# Patient Record
Sex: FEMALE | Race: WHITE | Employment: PART TIME | URBAN - METROPOLITAN AREA
[De-identification: names, ages, dates, MRNs, and addresses within clinical notes are randomized per-mention and may not be internally consistent; named-entity substitution may affect disease eponyms.]

---

## 2017-02-13 ENCOUNTER — OFFICE VISIT (OUTPATIENT)
Dept: FAMILY MEDICINE CLINIC | Age: 37
End: 2017-02-13

## 2017-02-13 VITALS
BODY MASS INDEX: 21.99 KG/M2 | TEMPERATURE: 98.5 F | OXYGEN SATURATION: 98 % | DIASTOLIC BLOOD PRESSURE: 64 MMHG | HEART RATE: 85 BPM | WEIGHT: 132 LBS | RESPIRATION RATE: 18 BRPM | SYSTOLIC BLOOD PRESSURE: 107 MMHG | HEIGHT: 65 IN

## 2017-02-13 DIAGNOSIS — J02.0 STREP THROAT: ICD-10-CM

## 2017-02-13 DIAGNOSIS — J02.9 SORE THROAT: Primary | ICD-10-CM

## 2017-02-13 LAB
S PYO AG THROAT QL: NEGATIVE
VALID INTERNAL CONTROL?: YES

## 2017-02-13 RX ORDER — AMOXICILLIN 875 MG/1
875 TABLET, FILM COATED ORAL 2 TIMES DAILY
Qty: 20 TAB | Refills: 0 | Status: SHIPPED | OUTPATIENT
Start: 2017-02-13 | End: 2017-02-23

## 2017-02-13 RX ORDER — FLUCONAZOLE 150 MG/1
150 TABLET ORAL DAILY
Qty: 1 TAB | Refills: 0 | Status: SHIPPED | OUTPATIENT
Start: 2017-02-13 | End: 2017-02-14

## 2017-02-13 NOTE — PROGRESS NOTES
Acute Care Visit    Today's Date:  2017   Patient's Name: Lisa Alves   Patient's :  1980     History:     Chief Complaint   Patient presents with    Sore Throat     started this morning, son has strep    Ear Pain     right        Lisa Alves is a 39 y.o. female presenting for [employer]. Sore Throat    Onset: 1-2 days she woke up with throat pain this am  Reports ear pain and congestion  Aggravating/Alleviating factors: worse with swallowing  Treatment at home:  none  SIck contacts: her 2 sons have strep throat and are being treated  Smoking history: denies      Past Medical History   Diagnosis Date    Knee MCL sprain 2013     right, with bone bruise     Past Surgical History   Procedure Laterality Date    Hx breast augmentation      Hx breast augmentation       Social History     Social History    Marital status: LEGALLY      Spouse name: N/A    Number of children: N/A    Years of education: N/A     Occupational History    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[employer]     Social History Main Topics    Smoking status: Never Smoker    Smokeless tobacco: Never Used    Alcohol use 1.2 oz/week     2 Glasses of wine per week      Comment: occasionally    Drug use: No    Sexual activity: Yes     Partners: Male     Birth control/ protection: Surgical     Other Topics Concern    None     Social History Narrative     Family History   Problem Relation Age of Onset    Cancer Father      lung    Diabetes Father     Hypertension Father     Breast Cancer Maternal Grandmother     Heart Attack Maternal Grandfather      Allergies   Allergen Reactions    Morphine Anaphylaxis     Patient had surgery in May 2013 and was given gradual doses of morphine without any complications. Problem List:    There is no problem list on file for this patient.       Medications:     Current Outpatient Prescriptions   Medication Sig    amoxicillin (AMOXIL) 875 mg tablet Take 1 Tab by mouth two (2) times a day for 10 days.  fluconazole (DIFLUCAN) 150 mg tablet Take 1 Tab by mouth daily for 1 day. FDA advises cautious prescribing of oral fluconazole in pregnancy.  levonorgestrel-ethinyl estradiol (LUTERA, 28,) 0.1-20 mg-mcg per tablet Take 1 Tab by mouth daily.  MULTIVITAMIN PO Take  by mouth. No current facility-administered medications for this visit. Constitutional: negative for fevers, chills, sweats and fatigue  Ears, nose, mouth, throat, and face: positive for earaches, nasal congestion and sore throat, negative for hoarseness  Respiratory: negative for cough, sputum or wheezing  Cardiovascular: negative for chest pain, chest pressure/discomfort, dyspnea  Gastrointestinal: negative for nausea, vomiting, diarrhea, constipation and abdominal pain    Physical Assessment:   VS:    Visit Vitals    /64 (BP 1 Location: Right arm, BP Patient Position: Sitting)    Pulse 85    Temp 98.5 °F (36.9 °C) (Oral)    Resp 18    Ht 5' 5\" (1.651 m)    Wt 132 lb (59.9 kg)    LMP 01/25/2017 (Exact Date)    SpO2 98%    BMI 21.97 kg/m2       Lab Results   Component Value Date/Time    Sodium 141 04/04/2014 12:00 AM    Potassium 3.8 04/04/2014 12:00 AM    Chloride 101 04/04/2014 12:00 AM    CO2 23 04/04/2014 12:00 AM    Glucose 79 04/04/2014 12:00 AM    BUN 9 04/04/2014 12:00 AM    Creatinine 0.95 04/04/2014 12:00 AM    BUN/Creatinine ratio 9 04/04/2014 12:00 AM    GFR est AA 91 04/04/2014 12:00 AM    GFR est non-AA 79 04/04/2014 12:00 AM    Calcium 9.0 04/04/2014 12:00 AM       General:   Well-groomed, well-nourished, in no distress, pleasant, alert, appropriate and conversant. ENT:  Normal TM's and nasal mucosa  Mouth:  Good dentition, oropharynx WNL without membranes, exudates, petechiae or ulcers  Neck:   Neck supple, no swelling, mass or tenderness, no thyromegaly  Cardiovasc:   RRR, no MRG. Pulses 2+ and symmetric at distal extremities. Pulmonary:   Lungs clear bilaterally. Normal respiratory effort. Extremities:   No edema, no TTP bilateral calves. LEs warm and well-perfused. Neuro:   Alert and oriented, no focal deficits. No facial asymmetry noted. Skin:    No rashes or jaundice  Psych:  No pressured speech or abnormal thought content        Assessment/Plan & Orders:       1. Sore throat    2. Strep throat        Orders Placed This Encounter    AMB POC RAPID STREP A    amoxicillin (AMOXIL) 875 mg tablet    fluconazole (DIFLUCAN) 150 mg tablet       Strep Throat  -will treat with Amox recommend NSAIDs for comfort    Follow up as needed    *Plan of care reviewed with patient. Patient in agreement with plan and expresses understanding. All questions answered and patient encouraged to call or RTO if further questions or concerns.     Jeremiah Francisco MD  Family Medicine  2/13/2017  1:20 PM

## 2017-02-13 NOTE — PATIENT INSTRUCTIONS
Sore Throat: Care Instructions  Your Care Instructions    Infection by bacteria or a virus causes most sore throats. Cigarette smoke, dry air, air pollution, allergies, and yelling can also cause a sore throat. Sore throats can be painful and annoying. Fortunately, most sore throats go away on their own. If you have a bacterial infection, your doctor may prescribe antibiotics. Follow-up care is a key part of your treatment and safety. Be sure to make and go to all appointments, and call your doctor if you are having problems. It's also a good idea to know your test results and keep a list of the medicines you take. How can you care for yourself at home? · If your doctor prescribed antibiotics, take them as directed. Do not stop taking them just because you feel better. You need to take the full course of antibiotics. · Gargle with warm salt water once an hour to help reduce swelling and relieve discomfort. Use 1 teaspoon of salt mixed in 1 cup of warm water. · Take an over-the-counter pain medicine, such as acetaminophen (Tylenol), ibuprofen (Advil, Motrin), or naproxen (Aleve). Read and follow all instructions on the label. · Be careful when taking over-the-counter cold or flu medicines and Tylenol at the same time. Many of these medicines have acetaminophen, which is Tylenol. Read the labels to make sure that you are not taking more than the recommended dose. Too much acetaminophen (Tylenol) can be harmful. · Drink plenty of fluids. Fluids may help soothe an irritated throat. Hot fluids, such as tea or soup, may help decrease throat pain. · Use over-the-counter throat lozenges to soothe pain. Regular cough drops or hard candy may also help. These should not be given to young children because of the risk of choking. · Do not smoke or allow others to smoke around you. If you need help quitting, talk to your doctor about stop-smoking programs and medicines.  These can increase your chances of quitting for good. · Use a vaporizer or humidifier to add moisture to your bedroom. Follow the directions for cleaning the machine. When should you call for help? Call your doctor now or seek immediate medical care if:  · You have new or worse trouble swallowing. · Your sore throat gets much worse on one side. Watch closely for changes in your health, and be sure to contact your doctor if you do not get better as expected. Where can you learn more? Go to http://julius-carmen.info/. Enter [id number] in the search box to learn more about \"Sore Throat: Care Instructions. \"  Current as of: July 29, 2016  Content Version: 11.1  © 6182-1110 [employer]. Care instructions adapted under license by [employer] (which disclaims liability or warranty for this information). If you have questions about a medical condition or this instruction, always ask your healthcare professional. [address] any warranty or liability for your use of this information.

## 2017-04-13 ENCOUNTER — OFFICE VISIT (OUTPATIENT)
Dept: OBGYN CLINIC | Age: 37
End: 2017-04-13

## 2017-04-13 VITALS
WEIGHT: 125 LBS | HEIGHT: 65 IN | SYSTOLIC BLOOD PRESSURE: 105 MMHG | HEART RATE: 73 BPM | DIASTOLIC BLOOD PRESSURE: 62 MMHG | BODY MASS INDEX: 20.83 KG/M2

## 2017-04-13 DIAGNOSIS — N94.6 DYSMENORRHEA: ICD-10-CM

## 2017-04-13 DIAGNOSIS — Z11.3 ENCOUNTER FOR SCREENING EXAMINATION FOR SEXUALLY TRANSMITTED DISEASE: ICD-10-CM

## 2017-04-13 DIAGNOSIS — Z01.419 WELL WOMAN EXAM WITH ROUTINE GYNECOLOGICAL EXAM: Primary | ICD-10-CM

## 2017-04-13 RX ORDER — LEVONORGESTREL AND ETHINYL ESTRADIOL 100-20(84)
1 KIT ORAL DAILY
Qty: 1 PACKAGE | Refills: 4 | Status: SHIPPED | OUTPATIENT
Start: 2017-04-13 | End: 2019-04-28 | Stop reason: SDUPTHER

## 2017-04-13 NOTE — PATIENT INSTRUCTIONS
Well Visit, Ages 25 to 48: Care Instructions  Your Care Instructions  Physical exams can help you stay healthy. Your doctor has checked your overall health and may have suggested ways to take good care of yourself. He or she also may have recommended tests. At home, you can help prevent illness with healthy eating, regular exercise, and other steps. Follow-up care is a key part of your treatment and safety. Be sure to make and go to all appointments, and call your doctor if you are having problems. It's also a good idea to know your test results and keep a list of the medicines you take. How can you care for yourself at home? · Reach and stay at a healthy weight. This will lower your risk for many problems, such as obesity, diabetes, heart disease, and high blood pressure. · Get at least 30 minutes of physical activity on most days of the week. Walking is a good choice. You also may want to do other activities, such as running, swimming, cycling, or playing tennis or team sports. Discuss any changes in your exercise program with your doctor. · Do not smoke or allow others to smoke around you. If you need help quitting, talk to your doctor about stop-smoking programs and medicines. These can increase your chances of quitting for good. · Talk to your doctor about whether you have any risk factors for sexually transmitted infections (STIs). Having one sex partner (who does not have STIs and does not have sex with anyone else) is a good way to avoid these infections. · Use birth control if you do not want to have children at this time. Talk with your doctor about the choices available and what might be best for you. · Protect your skin from too much sun. When you're outdoors from 10 a.m. to 4 p.m., stay in the shade or cover up with clothing and a hat with a wide brim. Wear sunglasses that block UV rays. Even when it's cloudy, put broad-spectrum sunscreen (SPF 30 or higher) on any exposed skin.   · See a dentist one or two times a year for checkups and to have your teeth cleaned. · Wear a seat belt in the car. · Drink alcohol in moderation, if at all. That means no more than 2 drinks a day for men and 1 drink a day for women. Follow your doctor's advice about when to have certain tests. These tests can spot problems early. For everyone  · Cholesterol. Have the fat (cholesterol) in your blood tested after age 21. Your doctor will tell you how often to have this done based on your age, family history, or other things that can increase your risk for heart disease. · Blood pressure. Have your blood pressure checked during a routine doctor visit. Your doctor will tell you how often to check your blood pressure based on your age, your blood pressure results, and other factors. · Vision. Talk with your doctor about how often to have a glaucoma test.  · Diabetes. Ask your doctor whether you should have tests for diabetes. · Colon cancer. Have a test for colon cancer at age 48. You may have one of several tests. If you are younger than 48, you may need a test earlier if you have any risk factors. Risk factors include whether you already had a precancerous polyp removed from your colon or whether your parent, brother, sister, or child has had colon cancer. For women  · Breast exam and mammogram. Talk to your doctor about when you should have a clinical breast exam and a mammogram. Medical experts differ on whether and how often women under 50 should have these tests. Your doctor can help you decide what is right for you. · Pap test and pelvic exam. Begin Pap tests at age 24. A Pap test is the best way to find cervical cancer. The test often is part of a pelvic exam. Ask how often to have this test.  · Tests for sexually transmitted infections (STIs). Ask whether you should have tests for STIs. You may be at risk if you have sex with more than one person, especially if your partners do not wear condoms.   For men  · Tests for sexually transmitted infections (STIs). Ask whether you should have tests for STIs. You may be at risk if you have sex with more than one person, especially if you do not wear a condom. · Testicular cancer exam. Ask your doctor whether you should check your testicles regularly. · Prostate exam. Talk to your doctor about whether you should have a blood test (called a PSA test) for prostate cancer. Experts differ on whether and when men should have this test. Some experts suggest it if you are older than 39 and are -American or have a father or brother who got prostate cancer when he was younger than 72. When should you call for help? Watch closely for changes in your health, and be sure to contact your doctor if you have any problems or symptoms that concern you. Where can you learn more? Go to http://julius-carmen.info/. Enter P072 in the search box to learn more about \"Well Visit, Ages 25 to 48: Care Instructions. \"  Current as of: July 19, 2016  Content Version: 11.2  © 9454-5113 [employer]. Care instructions adapted under license by [employer] (which disclaims liability or warranty for this information). If you have questions about a medical condition or this instruction, always ask your healthcare professional. [place] [address] any warranty or liability for your use of this information. Levonorgestrel/Ethinyl Estradiol (By mouth)   Ethinyl Estradiol (ETH-i-nil es-tra-DYE-ol), Levonorgestrel (duz-twi-twd-SOFIA-trel)  Prevents pregnancy. Brand Name(s):Altavera, Amethia, Amethia Lucy, Hayley, Lloyd, [place], Kathi Copeland, Bennie, Anna, Raquel, [place], [place], [place], Froilan Dsouza   There may be other brand names for this medicine. When This Medicine Should Not Be Used: You should not use this medicine if you have had an allergic reaction to hormone medicines.  Do not use this medicine if you are pregnant, or if you have abnormal vaginal bleeding that has not been checked by a doctor. You should not use this medicine if you have a history of cancer of the breast, ovary, or uterus. Do not use this medicine if you have a history of heart attacks, stroke, or blood clots. You should not use this medicine if you have liver cancer, or a history of jaundice (yellow skin or eyes) caused by pregnancy or birth control pills. How to Use This Medicine:   Tablet  · Your doctor will tell you how much medicine to use. Do not use more than directed. · Read and follow the patient instructions that come with this medicine. Talk to your doctor or pharmacist if you have any questions. · Carefully follow your doctor's instructions about when to start taking your medicine. You may begin taking the pills on the first day of your menstrual period, or on the Sunday after your period begins. · You may need to use a second form of birth control when you first start using this medicine. Talk with your doctor about your individual situation. Some other forms of birth control include condoms, diaphragms, or contraceptive foams or jellies. · Take this medicine at the same time every day. Birth control pills work best when there is no more than 24 hours between doses. It is very important that you take this medicine on time every day. If a dose is missed:   · This medicine has specific patient instructions on what to do if you miss a dose. Read and follow these instructions carefully, and call your doctor if you have any questions  · If you miss one pink pill, take it as soon as you can. Then take your next pill at the regular time. This means you may take two pills in one day. · If you miss two pink pills in a row during Week 1 or 2, take two pills as soon as you can. Take two more pills on the next day. Then go back to your regular schedule of taking one pill every day.  Use a second form of birth control until you have been taking pink pills for seven days in a row. · If you started this medicine on Day 1 of your period and you miss two pink pills in a row during Week 3, throw out the rest of your pills and start a new pack the same day. If you miss three or more pink pills in a row during any week, throw out the rest of your pills and start a new pack the same day. · If you started this medicine on the Sunday after your period started and you miss two pink pills in a row during Week 3, keep taking one pill every day until the next Sunday. Then throw away the rest of your pills and start a new pack on that same Sunday. · If you started this medicine on the Sunday after your period started and you miss three or more pink pills in a row during any week, keep taking one pill every day until the next Sunday. Then throw away the rest of your pills and start a new pack on that same Sunday. · If you miss your pills and change your schedule, you may not have a period for that month. Make sure your doctor knows if you miss your period two months in a row, because you may be pregnant. · You could have light bleeding or spotting any time you do not take a pill on time. The more pills you miss, the more likely you are to have bleeding. · To prevent a pregnancy, you should use a second form of birth control for the next seven days after you miss a dose. Some other forms of birth control include condoms, diaphragms, or contraceptive foams or jellies. How to Store and Dispose of This Medicine:   · Store the medicine in a closed container at room temperature, away from heat, moisture, and direct light. · Ask your pharmacist, doctor, or health caregiver about the best way to dispose of any outdated medicine or medicine no longer needed. · Keep all medicine out of the reach of children. Never share your medicine with anyone.   Drugs and Foods to Avoid:   Ask your doctor or pharmacist before using any other medicine, including over-the-counter medicines, vitamins, and herbal products. · Make sure your doctor knows if you are using phenylbutazone (Butazolidin®) or rifampin (Rifadin®, Rimactane®). Tell your doctor if you use seizure medicines such as phenobarbital or phenytoin (Dilantin®). Make sure your doctor knows if you are using any antibiotics such as ampicillin, griseofulvin, or tetracycline. Warnings While Using This Medicine:   · Make sure your doctor knows if you are breast feeding, or have recently been pregnant. Tell your doctor if you have heart disease, high cholesterol, or high triglycerides (lipids). Tell your doctor if you have migraine headaches, diabetes, gallbladder disease, or a history of depression. · This medicine may increase your risk of certain heart or blood vessel problems. Tell your doctor if you have a history of heart attack, stroke, high blood pressure, congestive heart failure, blood clots, or circulation problems. · Your risk of heart disease or stroke from this medicine is higher if you smoke. Your risk is also increased if you have diabetes or high cholesterol, or if you are overweight. Talk with your doctor about ways to stop smoking. Keep your diabetes under control. Ask your doctor about diet and exercise to control your weight and blood cholesterol level. · This medicine may also increase your risk of certain types of cancer. Talk to your doctor if you have concerns about this risk. · You might have some light bleeding or spotting when you first start using this medicine. This is usually normal and should not last long. However, if you have heavy bleeding or the bleeding lasts more than seven days in a row, call your doctor's office. You should not have a \"normal\" menstrual period until you start taking the white pills. The white pills are the last seven pills in your package and do not contain hormones. · Call your doctor for a pregnancy test if your menstrual period does not start when you take the last seven white pills.   · Tell any doctor or dentist who treats you that you are using this medicine. This medicine may affect certain medical test results. · Your doctor will check your progress and the effects of this medicine at regular visits. Keep all appointments. · This medicine will not protect you from getting HIV, AIDS, or other sexually transmitted diseases. If this is a concern for you, talk with your doctor. Possible Side Effects While Using This Medicine:   Call your doctor right away if you notice any of these side effects:  · Allergic reaction: Itching or hives, swelling in your face or hands, swelling or tingling in your mouth or throat, chest tightness, trouble breathing  · Breast changes or lumps. · Decrease in how much or how often you urinate. · Chest pain, or coughing up blood. · Lighter or heavier menstrual periods. · Numbness or weakness in your arm or leg, or on one side of your body. · Pain in your lower leg (calf). · Pain in your upper right stomach. · Shortness of breath, cold sweat, and bluish-colored skin. · Sudden or severe headache, problems with vision, speech, or walking. · Sweating, nausea or vomiting, pain in your chest, jaw, and left arm. · Swelling in your hands, ankles, or feet. · Unusual bleeding or bruising. · Yellowing of your skin or the whites of your eyes. If you notice these less serious side effects, talk with your doctor:   · Acne, increased non-scalp hair growth, or darkened skin on your face. · Breast pain, swelling, or tenderness. · Migraine headache, or a headache with vision changes or sensitivity to light. · Mood changes, depression, nervousness, or dizziness. · Nausea, vomiting, diarrhea, or upset stomach. · Pain or burning with urination. · Problems with vision, or trouble wearing contact lenses. · Vaginal itching or discharge. · Weight gain or loss, or appetite changes. If you notice other side effects that you think are caused by this medicine, tell your doctor. Call your doctor for medical advice about side effects. You may report side effects to FDA at 8-564-DGQ-6347  © 2016 [address] is for End User's use only and may not be sold, redistributed or otherwise used for commercial purposes. The above information is an  only. It is not intended as medical advice for individual conditions or treatments. Talk to your doctor, nurse or pharmacist before following any medical regimen to see if it is safe and effective for you.

## 2017-04-13 NOTE — MR AVS SNAPSHOT
Visit Information Date & Time Provider Department Dept. Phone Encounter #  
 4/13/2017  9:30 AM Francisco Javier Echols [id number] Follow-up Instructions Return as needed. Upcoming Health Maintenance Date Due  
 PAP AKA CERVICAL CYTOLOGY 3/29/2015 INFLUENZA AGE 9 TO ADULT 8/1/2016 Allergies as of 4/13/2017  Review Complete On: 4/13/2017 By: Tommie Gamez MD  
 No Active Allergies Current Immunizations  Reviewed on 5/14/2013 Name Date PPD 1/25/2011 TDAP Vaccine 3/29/2012 Not reviewed this visit You Were Diagnosed With   
  
 Codes Comments Well woman exam with routine gynecological exam    -  Primary ICD-10-CM: T37.234 ICD-9-CM: V72.31 Dysmenorrhea     ICD-10-CM: N94.6 ICD-9-CM: 368. 3 Vitals BP Pulse Height(growth percentile) Weight(growth percentile) LMP BMI  
 105/62 (BP 1 Location: Right arm, BP Patient Position: Sitting) 73 5' 5\" (1.651 m) 125 lb (56.7 kg) 03/15/2017 (Exact Date) 20.8 kg/m2 OB Status Smoking Status Having regular periods Never Smoker BMI and BSA Data Body Mass Index Body Surface Area  
 20.8 kg/m 2 1.61 m 2 Preferred Pharmacy Pharmacy Name Phone [address] [phone] Your Updated Medication List  
  
   
This list is accurate as of: 4/13/17 10:32 AM.  Always use your most recent med list.  
  
  
  
  
 L-Norgest&E Estradiol-E Estrad 0.10 mg-20 mcg (84)/10 mcg (7) 3mpk Commonly known as:  CAMRESE LO Take 1 Tab by mouth daily. MULTIVITAMIN PO Take  by mouth. Prescriptions Sent to Pharmacy Refills L-Norgest&E Estradiol-E Estrad (CAMRESE LO) 0.10 mg-20 mcg (84)/10 mcg (7) 3MPk 4 Sig: Take 1 Tab by mouth daily. Class: Normal  
 Pharmacy: [address] Ph #: [phone]  Route: Oral  
  
 Follow-up Instructions Return as needed. Patient Instructions Well Visit, Ages 25 to 48: Care Instructions Your Care Instructions Physical exams can help you stay healthy. Your doctor has checked your overall health and may have suggested ways to take good care of yourself. He or she also may have recommended tests. At home, you can help prevent illness with healthy eating, regular exercise, and other steps. Follow-up care is a key part of your treatment and safety. Be sure to make and go to all appointments, and call your doctor if you are having problems. It's also a good idea to know your test results and keep a list of the medicines you take. How can you care for yourself at home? · Reach and stay at a healthy weight. This will lower your risk for many problems, such as obesity, diabetes, heart disease, and high blood pressure. · Get at least 30 minutes of physical activity on most days of the week. Walking is a good choice. You also may want to do other activities, such as running, swimming, cycling, or playing tennis or team sports. Discuss any changes in your exercise program with your doctor. · Do not smoke or allow others to smoke around you. If you need help quitting, talk to your doctor about stop-smoking programs and medicines. These can increase your chances of quitting for good. · Talk to your doctor about whether you have any risk factors for sexually transmitted infections (STIs). Having one sex partner (who does not have STIs and does not have sex with anyone else) is a good way to avoid these infections. · Use birth control if you do not want to have children at this time. Talk with your doctor about the choices available and what might be best for you. · Protect your skin from too much sun. When you're outdoors from 10 a.m. to 4 p.m., stay in the shade or cover up with clothing and a hat with a wide brim. Wear sunglasses that block UV rays.  Even when it's cloudy, put broad-spectrum sunscreen (SPF 30 or higher) on any exposed skin. · See a dentist one or two times a year for checkups and to have your teeth cleaned. · Wear a seat belt in the car. · Drink alcohol in moderation, if at all. That means no more than 2 drinks a day for men and 1 drink a day for women. Follow your doctor's advice about when to have certain tests. These tests can spot problems early. For everyone · Cholesterol. Have the fat (cholesterol) in your blood tested after age 21. Your doctor will tell you how often to have this done based on your age, family history, or other things that can increase your risk for heart disease. · Blood pressure. Have your blood pressure checked during a routine doctor visit. Your doctor will tell you how often to check your blood pressure based on your age, your blood pressure results, and other factors. · Vision. Talk with your doctor about how often to have a glaucoma test. 
· Diabetes. Ask your doctor whether you should have tests for diabetes. · Colon cancer. Have a test for colon cancer at age 48. You may have one of several tests. If you are younger than 48, you may need a test earlier if you have any risk factors. Risk factors include whether you already had a precancerous polyp removed from your colon or whether your parent, brother, sister, or child has had colon cancer. For women · Breast exam and mammogram. Talk to your doctor about when you should have a clinical breast exam and a mammogram. Medical experts differ on whether and how often women under 50 should have these tests. Your doctor can help you decide what is right for you. · Pap test and pelvic exam. Begin Pap tests at age 24. A Pap test is the best way to find cervical cancer. The test often is part of a pelvic exam. Ask how often to have this test. 
· Tests for sexually transmitted infections (STIs).  Ask whether you should have tests for STIs. You may be at risk if you have sex with more than one person, especially if your partners do not wear condoms. For men · Tests for sexually transmitted infections (STIs). Ask whether you should have tests for STIs. You may be at risk if you have sex with more than one person, especially if you do not wear a condom. · Testicular cancer exam. Ask your doctor whether you should check your testicles regularly. · Prostate exam. Talk to your doctor about whether you should have a blood test (called a PSA test) for prostate cancer. Experts differ on whether and when men should have this test. Some experts suggest it if you are older than 39 and are -American or have a father or brother who got prostate cancer when he was younger than 72. When should you call for help? Watch closely for changes in your health, and be sure to contact your doctor if you have any problems or symptoms that concern you. Where can you learn more? Go to http://julius-carmen.info/. Enter P072 in the search box to learn more about \"Well Visit, Ages 25 to 48: Care Instructions. \" Current as of: July 19, 2016 Content Version: 11.2 © 2289-3414 [employer]. Care instructions adapted under license by itembase (which disclaims liability or warranty for this information). If you have questions about a medical condition or this instruction, always ask your healthcare professional. [address] any warranty or liability for your use of this information. Levonorgestrel/Ethinyl Estradiol (By mouth) Ethinyl Estradiol (ETH-i-nil es-tra-DYE-ol), Levonorgestrel (fub-kbd-znu-SOFIA-trel) Prevents pregnancy. Brand Name(s):[place], [place], [address] [place], [place], [place], [place], Bennie Juarez [place], [address], [place], [facility], [address], Sandy Hernandez There may be other brand names for this medicine. When This Medicine Should Not Be Used: You should not use this medicine if you have had an allergic reaction to hormone medicines. Do not use this medicine if you are pregnant, or if you have abnormal vaginal bleeding that has not been checked by a doctor. You should not use this medicine if you have a history of cancer of the breast, ovary, or uterus. Do not use this medicine if you have a history of heart attacks, stroke, or blood clots. You should not use this medicine if you have liver cancer, or a history of jaundice (yellow skin or eyes) caused by pregnancy or birth control pills. How to Use This Medicine:  
Tablet · Your doctor will tell you how much medicine to use. Do not use more than directed. · Read and follow the patient instructions that come with this medicine. Talk to your doctor or pharmacist if you have any questions. · Carefully follow your doctor's instructions about when to start taking your medicine. You may begin taking the pills on the first day of your menstrual period, or on the Sunday after your period begins. · You may need to use a second form of birth control when you first start using this medicine. Talk with your doctor about your individual situation. Some other forms of birth control include condoms, diaphragms, or contraceptive foams or jellies. · Take this medicine at the same time every day. Birth control pills work best when there is no more than 24 hours between doses. It is very important that you take this medicine on time every day. If a dose is missed: · This medicine has specific patient instructions on what to do if you miss a dose. Read and follow these instructions carefully, and call your doctor if you have any questions · If you miss one pink pill, take it as soon as you can. Then take your next pill at the regular time. This means you may take two pills in one day.  
· If you miss two pink pills in a row during Week 1 or 2, take two pills as soon as you can. Take two more pills on the next day. Then go back to your regular schedule of taking one pill every day. Use a second form of birth control until you have been taking pink pills for seven days in a row. · If you started this medicine on Day 1 of your period and you miss two pink pills in a row during Week 3, throw out the rest of your pills and start a new pack the same day. If you miss three or more pink pills in a row during any week, throw out the rest of your pills and start a new pack the same day. · If you started this medicine on the Sunday after your period started and you miss two pink pills in a row during Week 3, keep taking one pill every day until the next Sunday. Then throw away the rest of your pills and start a new pack on that same Sunday. · If you started this medicine on the Sunday after your period started and you miss three or more pink pills in a row during any week, keep taking one pill every day until the next Sunday. Then throw away the rest of your pills and start a new pack on that same Sunday. · If you miss your pills and change your schedule, you may not have a period for that month. Make sure your doctor knows if you miss your period two months in a row, because you may be pregnant. · You could have light bleeding or spotting any time you do not take a pill on time. The more pills you miss, the more likely you are to have bleeding. · To prevent a pregnancy, you should use a second form of birth control for the next seven days after you miss a dose. Some other forms of birth control include condoms, diaphragms, or contraceptive foams or jellies. How to Store and Dispose of This Medicine: · Store the medicine in a closed container at room temperature, away from heat, moisture, and direct light. · Ask your pharmacist, doctor, or health caregiver about the best way to dispose of any outdated medicine or medicine no longer needed. · Keep all medicine out of the reach of children. Never share your medicine with anyone. Drugs and Foods to Avoid: Ask your doctor or pharmacist before using any other medicine, including over-the-counter medicines, vitamins, and herbal products. · Make sure your doctor knows if you are using phenylbutazone (Butazolidin®) or rifampin (Rifadin®, Rimactane®). Tell your doctor if you use seizure medicines such as phenobarbital or phenytoin (Dilantin®). Make sure your doctor knows if you are using any antibiotics such as ampicillin, griseofulvin, or tetracycline. Warnings While Using This Medicine: · Make sure your doctor knows if you are breast feeding, or have recently been pregnant. Tell your doctor if you have heart disease, high cholesterol, or high triglycerides (lipids). Tell your doctor if you have migraine headaches, diabetes, gallbladder disease, or a history of depression. · This medicine may increase your risk of certain heart or blood vessel problems. Tell your doctor if you have a history of heart attack, stroke, high blood pressure, congestive heart failure, blood clots, or circulation problems. · Your risk of heart disease or stroke from this medicine is higher if you smoke. Your risk is also increased if you have diabetes or high cholesterol, or if you are overweight. Talk with your doctor about ways to stop smoking. Keep your diabetes under control. Ask your doctor about diet and exercise to control your weight and blood cholesterol level. · This medicine may also increase your risk of certain types of cancer. Talk to your doctor if you have concerns about this risk. · You might have some light bleeding or spotting when you first start using this medicine. This is usually normal and should not last long. However, if you have heavy bleeding or the bleeding lasts more than seven days in a row, call your doctor's office.  You should not have a \"normal\" menstrual period until you start taking the white pills. The white pills are the last seven pills in your package and do not contain hormones. · Call your doctor for a pregnancy test if your menstrual period does not start when you take the last seven white pills. · Tell any doctor or dentist who treats you that you are using this medicine. This medicine may affect certain medical test results. · Your doctor will check your progress and the effects of this medicine at regular visits. Keep all appointments. · This medicine will not protect you from getting HIV, AIDS, or other sexually transmitted diseases. If this is a concern for you, talk with your doctor. Possible Side Effects While Using This Medicine:  
Call your doctor right away if you notice any of these side effects: · Allergic reaction: Itching or hives, swelling in your face or hands, swelling or tingling in your mouth or throat, chest tightness, trouble breathing · Breast changes or lumps. · Decrease in how much or how often you urinate. · Chest pain, or coughing up blood. · Lighter or heavier menstrual periods. · Numbness or weakness in your arm or leg, or on one side of your body. · Pain in your lower leg (calf). · Pain in your upper right stomach. · Shortness of breath, cold sweat, and bluish-colored skin. · Sudden or severe headache, problems with vision, speech, or walking. · Sweating, nausea or vomiting, pain in your chest, jaw, and left arm. · Swelling in your hands, ankles, or feet. · Unusual bleeding or bruising. · Yellowing of your skin or the whites of your eyes. If you notice these less serious side effects, talk with your doctor: · Acne, increased non-scalp hair growth, or darkened skin on your face. · Breast pain, swelling, or tenderness. · Migraine headache, or a headache with vision changes or sensitivity to light. · Mood changes, depression, nervousness, or dizziness. · Nausea, vomiting, diarrhea, or upset stomach. · Pain or burning with urination. · Problems with vision, or trouble wearing contact lenses. · Vaginal itching or discharge. · Weight gain or loss, or appetite changes. If you notice other side effects that you think are caused by this medicine, tell your doctor. Call your doctor for medical advice about side effects. You may report side effects to FDA at 5-789-SGL-1581 © 2016 [address] is for End User's use only and may not be sold, redistributed or otherwise used for commercial purposes. The above information is an  only. It is not intended as medical advice for individual conditions or treatments. Talk to your doctor, nurse or pharmacist before following any medical regimen to see if it is safe and effective for you. Introducing [facility]! Dear Judy Gonzalez: Thank you for requesting a [email or web address] ([employer]) account. Our records indicate that you already have an active [email or web address] ([employer]) account. You can access your account anytime at [email or web address] Did you know that you can access your hospital and ER discharge instructions at any time in [email or web address] ([employer])? You can also review all of your test results from your hospital stay or ER visit. Additional Information If you have questions, please visit the Frequently Asked Questions section of the [email or web address] ([employer]) website at [email or web address] Remember, [email or web address] ([employer]) is NOT to be used for urgent needs. For medical emergencies, dial 911. Now available from your iPhone and Android! Please provide this summary of care documentation to your next provider. Your primary care clinician is listed as Arthur Garcia. If you have any questions after today's visit, please call [phone].

## 2017-04-14 LAB
HIV -1/0/2 AG/AB WITH REFLEX, 13463: NON REACTIVE
HIV 1 & 2 AB SER-IMP: NORMAL

## 2017-04-18 LAB
CHLAMYDIA TRACHOMATIS THINPREP, 13342: NEGATIVE
HPV H RFLX, 13184: NEGATIVE
NEISSERIA GONORRHOEAE THINPREP, 13343: NEGATIVE
PAP IMAGE GUIDED, 8900296: ABNORMAL
TRICHOMONAS NUC AMP-THIN PREP,13357: NEGATIVE

## 2019-04-22 ENCOUNTER — TELEPHONE (OUTPATIENT)
Dept: OBGYN CLINIC | Age: 39
End: 2019-04-22

## 2019-04-30 RX ORDER — LEVONORGESTREL AND ETHINYL ESTRADIOL 100-20(84)
KIT ORAL
Qty: 1 DOSE PACK | Refills: 3 | Status: SHIPPED | OUTPATIENT
Start: 2019-04-30 | End: 2019-05-23 | Stop reason: SDUPTHER

## 2019-05-23 ENCOUNTER — HOSPITAL ENCOUNTER (OUTPATIENT)
Dept: LAB | Age: 39
Discharge: HOME | End: 2019-05-23
Payer: COMMERCIAL

## 2019-05-23 ENCOUNTER — OFFICE VISIT (OUTPATIENT)
Dept: OBGYN CLINIC | Age: 39
End: 2019-05-23

## 2019-05-23 VITALS
HEART RATE: 89 BPM | TEMPERATURE: 97.7 F | OXYGEN SATURATION: 98 % | HEIGHT: 65 IN | SYSTOLIC BLOOD PRESSURE: 104 MMHG | WEIGHT: 139.8 LBS | DIASTOLIC BLOOD PRESSURE: 66 MMHG | BODY MASS INDEX: 23.29 KG/M2 | RESPIRATION RATE: 18 BRPM

## 2019-05-23 DIAGNOSIS — N88.8 FRIABLE CERVIX: ICD-10-CM

## 2019-05-23 DIAGNOSIS — R87.610 ASCUS OF CERVIX WITH NEGATIVE HIGH RISK HPV: ICD-10-CM

## 2019-05-23 DIAGNOSIS — Z01.419 WELL WOMAN EXAM WITH ROUTINE GYNECOLOGICAL EXAM: ICD-10-CM

## 2019-05-23 DIAGNOSIS — Z01.419 WELL WOMAN EXAM WITH ROUTINE GYNECOLOGICAL EXAM: Primary | ICD-10-CM

## 2019-05-23 LAB — T PALLIDUM AB SER QL IA: NONREACTIVE

## 2019-05-23 PROCEDURE — 87624 HPV HI-RISK TYP POOLED RSLT: CPT

## 2019-05-23 PROCEDURE — 86780 TREPONEMA PALLIDUM: CPT

## 2019-05-23 PROCEDURE — 87389 HIV-1 AG W/HIV-1&-2 AB AG IA: CPT

## 2019-05-23 PROCEDURE — 88175 CYTOPATH C/V AUTO FLUID REDO: CPT

## 2019-05-23 PROCEDURE — 87491 CHLMYD TRACH DNA AMP PROBE: CPT

## 2019-05-23 RX ORDER — LEVONORGESTREL AND ETHINYL ESTRADIOL 100-20(84)
KIT ORAL
Qty: 3 DOSE PACK | Refills: 4 | Status: SHIPPED | OUTPATIENT
Start: 2019-05-23

## 2019-05-23 NOTE — PATIENT INSTRUCTIONS
Well Visit, Ages 25 to 48: Care Instructions Your Care Instructions Physical exams can help you stay healthy. Your doctor has checked your overall health and may have suggested ways to take good care of yourself. He or she also may have recommended tests. At home, you can help prevent illness with healthy eating, regular exercise, and other steps. Follow-up care is a key part of your treatment and safety. Be sure to make and go to all appointments, and call your doctor if you are having problems. It's also a good idea to know your test results and keep a list of the medicines you take. How can you care for yourself at home? · Reach and stay at a healthy weight. This will lower your risk for many problems, such as obesity, diabetes, heart disease, and high blood pressure. · Get at least 30 minutes of physical activity on most days of the week. Walking is a good choice. You also may want to do other activities, such as running, swimming, cycling, or playing tennis or team sports. Discuss any changes in your exercise program with your doctor. · Do not smoke or allow others to smoke around you. If you need help quitting, talk to your doctor about stop-smoking programs and medicines. These can increase your chances of quitting for good. · Talk to your doctor about whether you have any risk factors for sexually transmitted infections (STIs). Having one sex partner (who does not have STIs and does not have sex with anyone else) is a good way to avoid these infections. · Use birth control if you do not want to have children at this time. Talk with your doctor about the choices available and what might be best for you. · Protect your skin from too much sun. When you're outdoors from 10 a.m. to 4 p.m., stay in the shade or cover up with clothing and a hat with a wide brim. Wear sunglasses that block UV rays. Even when it's cloudy, put broad-spectrum sunscreen (SPF 30 or higher) on any exposed skin. · See a dentist one or two times a year for checkups and to have your teeth cleaned. · Wear a seat belt in the car. · Drink alcohol in moderation, if at all. That means no more than 2 drinks a day for men and 1 drink a day for women. Follow your doctor's advice about when to have certain tests. These tests can spot problems early. For everyone · Cholesterol. Have the fat (cholesterol) in your blood tested after age 21. Your doctor will tell you how often to have this done based on your age, family history, or other things that can increase your risk for heart disease. · Blood pressure. Have your blood pressure checked during a routine doctor visit. Your doctor will tell you how often to check your blood pressure based on your age, your blood pressure results, and other factors. · Vision. Talk with your doctor about how often to have a glaucoma test. 
· Diabetes. Ask your doctor whether you should have tests for diabetes. · Colon cancer. Have a test for colon cancer at age 48. You may have one of several tests. If you are younger than 48, you may need a test earlier if you have any risk factors. Risk factors include whether you already had a precancerous polyp removed from your colon or whether your parent, brother, sister, or child has had colon cancer. For women · Breast exam and mammogram. Talk to your doctor about when you should have a clinical breast exam and a mammogram. Medical experts differ on whether and how often women under 50 should have these tests. Your doctor can help you decide what is right for you. · Pap test and pelvic exam. Begin Pap tests at age 24. A Pap test is the best way to find cervical cancer. The test often is part of a pelvic exam. Ask how often to have this test. 
· Tests for sexually transmitted infections (STIs). Ask whether you should have tests for STIs. You may be at risk if you have sex with more than one person, especially if your partners do not wear condoms. For men · Tests for sexually transmitted infections (STIs). Ask whether you should have tests for STIs. You may be at risk if you have sex with more than one person, especially if you do not wear a condom. · Testicular cancer exam. Ask your doctor whether you should check your testicles regularly. · Prostate exam. Talk to your doctor about whether you should have a blood test (called a PSA test) for prostate cancer. Experts differ on whether and when men should have this test. Some experts suggest it if you are older than 39 and are -American or have a father or brother who got prostate cancer when he was younger than 72. When should you call for help? Watch closely for changes in your health, and be sure to contact your doctor if you have any problems or symptoms that concern you. Where can you learn more? Go to http://julius-carmen.info/. Enter P072 in the search box to learn more about \"Well Visit, Ages 25 to 48: Care Instructions. \" Current as of: March 28, 2018 Content Version: 11.9 © 7256-0949 [employer]. Care instructions adapted under license by Mam[employer] (which disclaims liability or warranty for this information). If you have questions about a medical condition or this instruction, always ask your healthcare professional. [place] [address] any warranty or liability for your use of this information. Ethinyl Estradiol/Norgestimate (Estarylla, Mono-Linyah, MonoNessa, Ortho Tri-Cyclen) - (By mouth) Why this medicine is used:  
Prevents pregnancy and treats acne. Contact a nurse or doctor right away if you have: · Blistering, peeling, red skin rash · Chest pain, trouble breathing, or coughing up blood · Breast lumps, tenderness, pain, swelling, or discharge · Sudden or severe headache, problems with vision, speech, or walking · Nausea, unusual sweating, fainting · Numbness or weakness on one side of your body, lower leg pain · Unusual or unexpected vaginal bleeding or heavy bleeding · Yellow skin or eyes © 2017 [facility] Information is for End User's use only and may not be sold, redistributed or otherwise used for commercial purposes.

## 2019-05-23 NOTE — PROGRESS NOTES
Vanessa Francisco presents today for   Chief Complaint   Patient presents with    Well Woman       Is someone accompanying this pt? NO    Patient IS USING birth control at this time . Is the patient using any DME equipment during OV? NO    Depression Screening:  3 most recent PHQ Screens 5/23/2019   Little interest or pleasure in doing things Not at all   Feeling down, depressed, irritable, or hopeless Not at all   Total Score PHQ 2 0   Trouble falling or staying asleep, or sleeping too much -   Feeling tired or having little energy -   Poor appetite, weight loss, or overeating -   Feeling bad about yourself - or that you are a failure or have let yourself or your family down -   Trouble concentrating on things such as school, work, reading, or watching TV -   Moving or speaking so slowly that other people could have noticed; or the opposite being so fidgety that others notice -   Thoughts of being better off dead, or hurting yourself in some way -   How difficult have these problems made it for you to do your work, take care of your home and get along with others -       Learning Assessment:  Learning Assessment 4/4/2014   PRIMARY LEARNER Patient   HIGHEST LEVEL OF EDUCATION - PRIMARY LEARNER  2 YEARS OF COLLEGE   BARRIERS PRIMARY LEARNER NONE   CO-LEARNER CAREGIVER No   PRIMARY LANGUAGE ENGLISH   LEARNER PREFERENCE PRIMARY READING     -   ANSWERED BY patient   RELATIONSHIP SELF       Abuse Screening:  Abuse Screening Questionnaire 4/4/2014   Do you ever feel afraid of your partner? N   Are you in a relationship with someone who physically or mentally threatens you? N   Is it safe for you to go home? Y       Fall Risk  No flowsheet data found.     This Visit Test  Results for orders placed or performed in visit on 04/13/17   HIV 1/2 AG/AB, 4TH GENERATION,W RFLX CONFIRM   Result Value Ref Range    HIV -1/0/2 AG/AB WITH REFLEX Non Reactive Non Reacti    HIV INTERPRETATION       HIV-1 antigen and HIV 1/2 antibodies not detected. No laboratory evidence of   CHLAM/GC/TRICH NUCL AMP THIN PREP   Result Value Ref Range    CHLAMYDIA TRACHOMATIS THINPREP Negative Negative    NEISSERIA GONORRHOEAE THINPREP Negative Negative    TRICHOMONAS NUC AMP-THIN PREP Negative Negative   HPV RFX 16&18   Result Value Ref Range    HPV H RFLX Negative Negative   PAP IG (IMAGE GUIDED)   Result Value Ref Range    PAP IMAGE GUIDED (A)        Health Maintenance reviewed and discussed and ordered per Provider. There are no preventive care reminders to display for this patient. .      Coordination of Care:  1. Have you been to the ER, urgent care clinic since your last visit? Hospitalized since your last visit? NO    2. Have you seen or consulted any other health care providers outside of the [address] [place] [address] since your last visit? Include any pap smears or colon screening. NO    Patient does not have any concerns at this time.
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
## 2019-05-24 LAB
C TRACH RRNA SPEC QL NAA+PROBE: NEGATIVE
HIV 1+2 AB+HIV1 P24 AG SERPL QL IA: NONREACTIVE
HIV12 RESULT COMMENT, HHIVC: NORMAL
N GONORRHOEA RRNA SPEC QL NAA+PROBE: NEGATIVE
SPECIMEN SOURCE: NORMAL
T VAGINALIS RRNA SPEC QL NAA+PROBE: NEGATIVE

## 2019-06-19 RX ORDER — FLUCONAZOLE 150 MG/1
150 TABLET ORAL DAILY
Qty: 1 TAB | Refills: 0 | Status: SHIPPED | OUTPATIENT
Start: 2019-06-19

## 2019-06-19 RX ORDER — FLUCONAZOLE 150 MG/1
150 TABLET ORAL DAILY
Qty: 1 TAB | Refills: 0 | Status: SHIPPED | OUTPATIENT
Start: 2019-06-19 | End: 2019-06-19 | Stop reason: SDUPTHER

## 2023-09-14 ENCOUNTER — OFFICE VISIT (OUTPATIENT)
Age: 43
End: 2023-09-14

## 2023-09-14 VITALS — WEIGHT: 147 LBS | HEIGHT: 65 IN | BODY MASS INDEX: 24.49 KG/M2

## 2023-09-14 DIAGNOSIS — S83.271A COMPLEX TEAR OF LATERAL MENISCUS OF RIGHT KNEE AS CURRENT INJURY, INITIAL ENCOUNTER: ICD-10-CM

## 2023-09-14 DIAGNOSIS — M25.561 RIGHT KNEE PAIN, UNSPECIFIED CHRONICITY: Primary | ICD-10-CM

## 2023-09-14 RX ORDER — LEVONORGESTREL AND ETHINYL ESTRADIOL 100-20(84)
1 KIT ORAL DAILY
COMMUNITY
Start: 2023-09-08

## 2023-09-14 RX ORDER — MELOXICAM 15 MG/1
15 TABLET ORAL DAILY
Qty: 30 TABLET | Refills: 1 | Status: SHIPPED | OUTPATIENT
Start: 2023-09-14

## 2023-09-14 NOTE — PROGRESS NOTES
Radha Stoner  1980   Chief Complaint   Patient presents with    Knee Pain     Rt         HISTORY OF PRESENT ILLNESS  Radha Stoner is a 43 y.o. female who presents today for evaluation of right knee pain. Pain is a 8/10. Pain has been present for 6 weeks. Patient has been having longstanding problems with her right knee stemming from an injury that she had skiing in 2012. In the recent past the pain has been worse. She had an episode about 6 weeks ago where she was running in the treadmill and felt a sudden catching sensation in her knee. Since then has had recurrent catching sensation associated with swelling to the point that affects her day-to-day activities. She finds herself limping at times despite having taken anti-inflammatory medication over-the-counter    Has tried following treatments: Injections:No; Brace:No; Therapy:No; Cane/Crutch:No      No Known Allergies     Past Medical History:   Diagnosis Date    Knee MCL sprain 2/2013    right, with bone bruise      Social History       Tobacco History       Smoking Status  Never      Smokeless Tobacco Use  Never              Alcohol History       Alcohol Use Status  Yes Amount  2.0 standard drinks of alcohol/wk              Drug Use       Drug Use Status  No              Sexual Activity       Sexually Active  Not Asked Birth Control/Protection  Pill                   Past Surgical History:   Procedure Laterality Date    BREAST SURGERY  2013    BREAST SURGERY  2004      Family History   Problem Relation Age of Onset    Diabetes Father     Hypertension Father     Breast Cancer Maternal Grandmother     Heart Attack Maternal Grandfather     Cancer Father         lung     Current Outpatient Medications   Medication Sig    Levonorgest-Eth Estrad 91-Day 0.1-0.02 & 0.01 MG TABS Take 1 tablet by mouth daily     No current facility-administered medications for this visit.        REVIEW OF SYSTEM   Patient denies: Weight loss, Fever/Chills,

## 2023-11-20 RX ORDER — MELOXICAM 15 MG/1
15 TABLET ORAL DAILY
Qty: 30 TABLET | Refills: 1 | Status: SHIPPED | OUTPATIENT
Start: 2023-11-20

## 2024-04-05 RX ORDER — MELOXICAM 15 MG/1
15 TABLET ORAL DAILY
Qty: 30 TABLET | Refills: 1 | Status: SHIPPED | OUTPATIENT
Start: 2024-04-05